# Patient Record
Sex: FEMALE | Race: WHITE | Employment: FULL TIME | URBAN - METROPOLITAN AREA
[De-identification: names, ages, dates, MRNs, and addresses within clinical notes are randomized per-mention and may not be internally consistent; named-entity substitution may affect disease eponyms.]

---

## 2017-11-20 ENCOUNTER — HOSPITAL ENCOUNTER (OUTPATIENT)
Dept: GENERAL RADIOLOGY | Age: 52
Discharge: HOME | End: 2017-11-20
Attending: FAMILY MEDICINE
Payer: COMMERCIAL

## 2017-11-20 DIAGNOSIS — S90.121A CONTUSION OF RIGHT FOOT INCLUDING TOES, INITIAL ENCOUNTER: ICD-10-CM

## 2017-11-20 DIAGNOSIS — S90.31XA CONTUSION OF RIGHT FOOT INCLUDING TOES, INITIAL ENCOUNTER: ICD-10-CM

## 2017-11-20 PROCEDURE — 73630 X-RAY EXAM OF FOOT: CPT

## 2017-11-20 NOTE — PROGRESS NOTES
You do have a broken toe (fracture). It is in good position (non-displaced) and does not extend into the joint. Get the post-op shoe and wear that. Buddy tape as you have. Apply ice as needed.   It takes an average of 6 weeks for broken toe to heal.  Let me know if symptoms worsen/persist  Dr. Lyna Dakins  Results released to [address] [place] [address] [phone] with comments